# Patient Record
Sex: FEMALE
[De-identification: names, ages, dates, MRNs, and addresses within clinical notes are randomized per-mention and may not be internally consistent; named-entity substitution may affect disease eponyms.]

---

## 2021-05-05 ENCOUNTER — APPOINTMENT (OUTPATIENT)
Dept: DISASTER EMERGENCY | Facility: OTHER | Age: 41
End: 2021-05-05
Payer: COMMERCIAL

## 2021-05-05 PROCEDURE — 0001A: CPT

## 2021-05-26 ENCOUNTER — APPOINTMENT (OUTPATIENT)
Dept: DISASTER EMERGENCY | Facility: OTHER | Age: 41
End: 2021-05-26
Payer: COMMERCIAL

## 2021-05-26 PROCEDURE — 0002A: CPT

## 2022-01-20 PROBLEM — Z00.00 ENCOUNTER FOR PREVENTIVE HEALTH EXAMINATION: Status: ACTIVE | Noted: 2022-01-20

## 2023-09-07 ENCOUNTER — APPOINTMENT (OUTPATIENT)
Dept: ORTHOPEDIC SURGERY | Facility: CLINIC | Age: 43
End: 2023-09-07
Payer: COMMERCIAL

## 2023-09-07 ENCOUNTER — NON-APPOINTMENT (OUTPATIENT)
Age: 43
End: 2023-09-07

## 2023-09-07 PROCEDURE — 99204 OFFICE O/P NEW MOD 45 MIN: CPT

## 2023-09-07 PROCEDURE — 73080 X-RAY EXAM OF ELBOW: CPT | Mod: RT

## 2023-09-07 NOTE — DISCUSSION/SUMMARY
[de-identified] : We had a thorough discussion regarding the nature of her pain, the pathophysiology, as well as all treatment options. Based on clinical exam, MRI and radiograph findings she has lateral epicondylitis. We discussed prolotherapy vs. surgical intervention. The patient has exhausted all measures of conservative treatment, including cortisone injections and anti-inflammatories, and still has recurrent pain. Considering the patient's current presentation of pain, physical exam, and radiographs an MRI is indicated at this time to better assess the elbow. A prescription for this was given to the patient. We will go over the MRI results with her upon obtaining the results in the office and advise her of further treatment options. She agrees with the above plan and all questions were answered.

## 2023-09-07 NOTE — DISCUSSION/SUMMARY
[de-identified] : We had a thorough discussion regarding the nature of her pain, the pathophysiology, as well as all treatment options. Based on clinical exam, MRI and radiograph findings she has lateral epicondylitis. We discussed prolotherapy vs. surgical intervention. The patient has exhausted all measures of conservative treatment, including cortisone injections and anti-inflammatories, and still has recurrent pain. Considering the patient's current presentation of pain, physical exam, and radiographs an MRI is indicated at this time to better assess the elbow. A prescription for this was given to the patient. We will go over the MRI results with her upon obtaining the results in the office and advise her of further treatment options. She agrees with the above plan and all questions were answered.

## 2023-09-07 NOTE — CONSULT LETTER
[Dear  ___] : Dear  [unfilled], [Consult Letter:] : I had the pleasure of evaluating your patient, [unfilled]. [Please see my note below.] : Please see my note below. [Sincerely,] : Sincerely, [FreeTextEntry3] : Dr. Rene Rios

## 2023-09-07 NOTE — PHYSICAL EXAM
[de-identified] : General: Well appearing, no acute distress Neurologic: A&Ox3, No focal deficits Head: NCAT without abrasions, lacerations, or ecchymosis to head, face, or scalp Eyes: No scleral icterus, no gross abnormalities Respiratory: Equal chest wall expansion bilaterally, no accessory muscle use Lymphatic: No lymphadenopathy palpated Skin: Warm and dry Psychiatric: Normal mood and affect  Right elbow shows no obvious deformity swelling or ecchymosis. There is tenderness to palpation over the lateral epicondyle and extensor muscle. No tenderness over the medial epicondyle. Elbow ROM 0-120 with pain. Full ROM with pronation and supination with pain. Pain upon resisted elbow supination. No instability to valgus or vaurs stress in the elbow. Pain with resisted wrist extension with simultaneous elbow extension. Negative Tinel sign at the cubital tunnel and carpal tunnel. Compartments soft and nontender. Sensation intact distally. 2+ cap refill.   [de-identified] : The following radiographs were ordered and read by me during this patient's visit. I reviewed each radiograph in detail with the patient and discussed the findings as highlighted below. 3 views of the R elbow were obtained today that show no fracture, dislocation. There is no degenerative change seen. There is no malalignment. No obvious osseous abnormality. Otherwise unremarkable.

## 2023-09-07 NOTE — ADDENDUM
[FreeTextEntry1] : Documented by Kim Ibanez acting as a scribe for Dr. Rios on 09/07/2023. All medical record entries made by the Scribe were at my, Dr. Rios's, direction and personally dictated by me on 09/07/2023. I have reviewed the chart and agree that the record accurately reflects my personal performance of the history, physical exam, procedure and imaging.

## 2023-09-07 NOTE — PHYSICAL EXAM
[de-identified] : General: Well appearing, no acute distress Neurologic: A&Ox3, No focal deficits Head: NCAT without abrasions, lacerations, or ecchymosis to head, face, or scalp Eyes: No scleral icterus, no gross abnormalities Respiratory: Equal chest wall expansion bilaterally, no accessory muscle use Lymphatic: No lymphadenopathy palpated Skin: Warm and dry Psychiatric: Normal mood and affect  Right elbow shows no obvious deformity swelling or ecchymosis. There is tenderness to palpation over the lateral epicondyle and extensor muscle. No tenderness over the medial epicondyle. Elbow ROM 0-120 with pain. Full ROM with pronation and supination with pain. Pain upon resisted elbow supination. No instability to valgus or vaurs stress in the elbow. Pain with resisted wrist extension with simultaneous elbow extension. Negative Tinel sign at the cubital tunnel and carpal tunnel. Compartments soft and nontender. Sensation intact distally. 2+ cap refill.   [de-identified] : The following radiographs were ordered and read by me during this patient's visit. I reviewed each radiograph in detail with the patient and discussed the findings as highlighted below. 3 views of the R elbow were obtained today that show no fracture, dislocation. There is no degenerative change seen. There is no malalignment. No obvious osseous abnormality. Otherwise unremarkable.

## 2023-09-09 NOTE — HISTORY OF PRESENT ILLNESS
[Worsening] : worsening [___ yrs] : [unfilled] year(s) ago [de-identified] : KALYAN is a 43 year female who presents today with complaints of right elbow pain. She states her pain has occurred for 4 years. She states she has seen an orthopedist in 2019 for the same issue. She states she has had multiple cortisone injections and physical therapy with minimal relief to this point. She states she is right hand dominant She notes periods of numbness/tingling in the involved extremity.

## 2023-09-09 NOTE — HISTORY OF PRESENT ILLNESS
[Worsening] : worsening [___ yrs] : [unfilled] year(s) ago [de-identified] : KALYAN is a 43 year female who presents today with complaints of right elbow pain. She states her pain has occurred for 4 years. She states she has seen an orthopedist in 2019 for the same issue. She states she has had multiple cortisone injections and physical therapy with minimal relief to this point. She states she is right hand dominant She notes periods of numbness/tingling in the involved extremity.

## 2023-10-05 ENCOUNTER — APPOINTMENT (OUTPATIENT)
Dept: ORTHOPEDIC SURGERY | Facility: CLINIC | Age: 43
End: 2023-10-05
Payer: COMMERCIAL

## 2023-10-05 DIAGNOSIS — M77.11 LATERAL EPICONDYLITIS, RIGHT ELBOW: ICD-10-CM

## 2023-10-05 PROCEDURE — 99213 OFFICE O/P EST LOW 20 MIN: CPT

## 2024-08-01 ENCOUNTER — OFFICE (OUTPATIENT)
Facility: LOCATION | Age: 44
Setting detail: OPHTHALMOLOGY
End: 2024-08-01
Payer: COMMERCIAL

## 2024-08-01 DIAGNOSIS — H01.005: ICD-10-CM

## 2024-08-01 DIAGNOSIS — H01.002: ICD-10-CM

## 2024-08-01 DIAGNOSIS — H01.001: ICD-10-CM

## 2024-08-01 DIAGNOSIS — H52.03: ICD-10-CM

## 2024-08-01 DIAGNOSIS — H01.004: ICD-10-CM

## 2024-08-01 DIAGNOSIS — H52.4: ICD-10-CM

## 2024-08-01 PROCEDURE — 92015 DETERMINE REFRACTIVE STATE: CPT | Performed by: OPHTHALMOLOGY

## 2024-08-01 PROCEDURE — 99203 OFFICE O/P NEW LOW 30 MIN: CPT | Performed by: OPHTHALMOLOGY

## 2024-08-01 ASSESSMENT — LID EXAM ASSESSMENTS
OD_BLEPHARITIS: RLL RUL 1+
OS_BLEPHARITIS: LLL LUL 1+

## 2024-08-01 ASSESSMENT — CONFRONTATIONAL VISUAL FIELD TEST (CVF)
OD_FINDINGS: FULL
OS_FINDINGS: FULL

## 2024-09-24 ENCOUNTER — OFFICE (OUTPATIENT)
Dept: URBAN - METROPOLITAN AREA CLINIC 77 | Facility: CLINIC | Age: 44
Setting detail: OPHTHALMOLOGY
End: 2024-09-24
Payer: COMMERCIAL

## 2024-09-24 DIAGNOSIS — H01.005: ICD-10-CM

## 2024-09-24 DIAGNOSIS — H01.002: ICD-10-CM

## 2024-09-24 DIAGNOSIS — H16.223: ICD-10-CM

## 2024-09-24 DIAGNOSIS — H01.004: ICD-10-CM

## 2024-09-24 DIAGNOSIS — H01.001: ICD-10-CM

## 2024-09-24 DIAGNOSIS — H52.4: ICD-10-CM

## 2024-09-24 PROCEDURE — 92014 COMPRE OPH EXAM EST PT 1/>: CPT | Performed by: STUDENT IN AN ORGANIZED HEALTH CARE EDUCATION/TRAINING PROGRAM

## 2024-09-24 ASSESSMENT — CONFRONTATIONAL VISUAL FIELD TEST (CVF)
OD_FINDINGS: FULL
OS_FINDINGS: FULL

## 2024-09-24 ASSESSMENT — LID EXAM ASSESSMENTS
OS_BLEPHARITIS: LLL LUL 1+
OD_BLEPHARITIS: RLL RUL 1+

## 2024-12-02 ASSESSMENT — REFRACTION_MANIFEST
OS_SPHERE: +0.50
OS_CYLINDER: SPH
OD_CYLINDER: SPH
OS_SPHERE: +1.50
OD_SPHERE: +1.50
OD_SPHERE: +0.50

## 2024-12-02 ASSESSMENT — KERATOMETRY
OS_K2POWER_DIOPTERS: 46.00
OD_K1POWER_DIOPTERS: 45.25
OD_AXISANGLE_DEGREES: 095
OD_K2POWER_DIOPTERS: 46.25
OS_K1POWER_DIOPTERS: 45.00
OS_AXISANGLE_DEGREES: 093

## 2024-12-02 ASSESSMENT — REFRACTION_CURRENTRX
OS_OVR_VA: 20/
OD_CYLINDER: SPH
OD_SPHERE: +0.50
OS_CYLINDER: SPH
OD_OVR_VA: 20/
OS_SPHERE: +0.50